# Patient Record
Sex: MALE | Race: WHITE | Employment: FULL TIME | URBAN - METROPOLITAN AREA
[De-identification: names, ages, dates, MRNs, and addresses within clinical notes are randomized per-mention and may not be internally consistent; named-entity substitution may affect disease eponyms.]

---

## 2018-03-09 DIAGNOSIS — H90.11 CONDUCTIVE HEARING LOSS OF RIGHT EAR WITH UNRESTRICTED HEARING OF LEFT EAR: ICD-10-CM

## 2018-03-09 DIAGNOSIS — Z90.89 HISTORY OF MASTOIDECTOMY: Primary | ICD-10-CM

## 2018-03-09 DIAGNOSIS — H71.91 CHOLESTEATOMA, RIGHT: ICD-10-CM

## 2018-03-15 ENCOUNTER — RADIANT APPOINTMENT (OUTPATIENT)
Dept: CT IMAGING | Facility: CLINIC | Age: 38
End: 2018-03-15
Attending: OTOLARYNGOLOGY
Payer: COMMERCIAL

## 2018-03-15 DIAGNOSIS — H71.91 CHOLESTEATOMA, RIGHT: ICD-10-CM

## 2018-03-15 DIAGNOSIS — H90.11 CONDUCTIVE HEARING LOSS OF RIGHT EAR WITH UNRESTRICTED HEARING OF LEFT EAR: ICD-10-CM

## 2018-03-15 DIAGNOSIS — Z90.89 HISTORY OF MASTOIDECTOMY: ICD-10-CM

## 2018-03-15 PROCEDURE — 70480 CT ORBIT/EAR/FOSSA W/O DYE: CPT | Mod: TC

## 2018-04-13 ENCOUNTER — TELEPHONE (OUTPATIENT)
Dept: OTOLARYNGOLOGY | Facility: CLINIC | Age: 38
End: 2018-04-13

## 2018-04-13 NOTE — TELEPHONE ENCOUNTER
Left patient voicemail regarding scheduled appointment with Dr Pena. Need to know where records are located for patient. Gave patient my direct phone number to contact.      Coral  [facility] Coordinator      4-13-18 Received message back from patient.    4-16-18 Left patient voicemail regarding scheduled appointment with Dr Pena. Gave patient my direct phone number to contact.

## 2018-04-16 DIAGNOSIS — H91.90 HEARING LOSS: Primary | ICD-10-CM

## 2018-04-16 NOTE — TELEPHONE ENCOUNTER
FUTURE VISIT INFORMATION      FUTURE VISIT INFORMATION:    Date: 4-19-18    Time:     Location:   REFERRAL INFORMATION:    Referring provider:      Referring providers clinic:      Reason for visit/diagnosis  History of mastoidectomy, cholesteatoma, right    conductive hearing loss of right ear with unrestricted hearing of left ear    RECORDS REQUESTED FROM:       Clinic name Comments Records Status Imaging Status   [place] [facility] Beatrice Dey-([place]) Patient states all records are from when he was a child and also has no way of signing and returning a signed authorization                                RECORDS STATUS

## 2018-04-19 ENCOUNTER — OFFICE VISIT (OUTPATIENT)
Dept: AUDIOLOGY | Facility: CLINIC | Age: 38
End: 2018-04-19
Payer: COMMERCIAL

## 2018-04-19 ENCOUNTER — OFFICE VISIT (OUTPATIENT)
Dept: OTOLARYNGOLOGY | Facility: CLINIC | Age: 38
End: 2018-04-19
Payer: COMMERCIAL

## 2018-04-19 ENCOUNTER — PRE VISIT (OUTPATIENT)
Dept: OTOLARYNGOLOGY | Facility: CLINIC | Age: 38
End: 2018-04-19

## 2018-04-19 VITALS — HEIGHT: 72 IN | BODY MASS INDEX: 27.09 KG/M2 | WEIGHT: 200 LBS

## 2018-04-19 DIAGNOSIS — H90.11 CONDUCTIVE HEARING LOSS OF RIGHT EAR WITH UNRESTRICTED HEARING OF LEFT EAR: Primary | ICD-10-CM

## 2018-04-19 DIAGNOSIS — Z90.89 HISTORY OF MASTOIDECTOMY: ICD-10-CM

## 2018-04-19 DIAGNOSIS — H91.90 HEARING LOSS: ICD-10-CM

## 2018-04-19 RX ORDER — AMOXICILLIN 500 MG/1
CAPSULE ORAL
Refills: 0 | COMMUNITY
Start: 2018-04-10 | End: 2018-08-03

## 2018-04-19 ASSESSMENT — PAIN SCALES - GENERAL: PAINLEVEL: NO PAIN (0)

## 2018-04-19 NOTE — LETTER
4/19/2018      RE: Osman Parks  [address] [facility] [address]   [facility] [address]       Osman Parks is seen today in follow-up. He was last seen in our clinic on 6/15/2016. He has a history of several right ear surgeries, with his most recent surgery being many years ago (~10 years at least). He has had a right canal wall down mastoidectomy. He follows with Dr. Barron, but has not seen him for about 2 years. He last saw us in 2016 and it this time there was some concern for a cholesteatoma. He presents today for follow-up with a new CT scan.    He reports that he has almost constant drainage from the right ear. He denies any changes in hearing. He does have tinnitus but this is stable. He intermittently has pain on the right. He denies any dizziness or vertigo. He has not had his mastoid bowl cleaned out for at least 2 years.     Review of systems:    Patient Supplied Answers to Review of Systems   ENT ROS 4/19/2018   Constitutional -   Ears, Nose, Throat Hearing loss, Ear pain, Ringing/noise in ears   Musculoskeletal -   A full 10 point review of systems was conducted and all other systems negative unless mentioned above or in HPI.     Physical examination:  male in no acute distress.  Alert and answering questions appropriately. HB 1/6 bilaterally. Right ear examined under the microscope. Surgical changes consistent with a right canal wall down mastoidectomy, though his meatus is small making visualization of the entire cavity difficult. There was copious amounts of debris and cerumen present within the cavity that was debrided using currette, right angle, alligator forceps, and suction. There was some debris very superior and posterior that could not be reached in clinic. The TM is healthy in appearance without perforation. The entire cavity was dry with healthy tissue throughout. No evidence of cholesteatoma.     Audiogram: Audiogram from today is reviewed. This shows normal hearing on left with PTA of  14dB, SRT of 10dB, and WRS of 100% at 50dB. On the right, he has a mild sloping to moderately severe conductive loss with PTA of 47dB, SRT of 30dB, and WRS of 100% at 80 dB. He has a type B tympanogram on right and type A on left. Only the left ipsi reflex was tested and that was normal.     CT: CT temporal bone was reviewed today. He has a small meatus (partly due to some bony overgrowth) and high facial ridge. No evidence of cholesteatoma. He appears to have had an incus interposition graft.     Assessment and plan:  Osman Pakrs is a 37 year old male with PMHx of many right-sided ear surgeries including a right canal wall down mastoidectomy about 10 years ago by an outside provider. He presents today for routine follow-up. CT scan does not show any evidence of cholesteatoma and after mastoid bowl debridement, no cholesteatoma on examination. Given how small his meatus is compared to his mastoid cavity, we discussed with him the recommendation to undergo meatoplasty to facilitate cleaning. We had a long discussion regarding the importance of regular debridements to prevent the formation of cholesteatoma. In addition, given his stable hearing, we would not recommend trying to do a new prosthesis on that side. If we can keep his ear dry, he can consider a hearing aid on that side. Patient does not want to make a decision about the meatoplasty at this time and will let us know if he wants to go forward. Otherwise, we recommend regular follow-up with Dr. Barron (at least yearly) for mastoid bowl debridements. All questions were answered and he is in agreement with this plan.     I, Carolyn Pena, saw this patient with the resident/fellow and agree with the resident s findings and plan of care as documented in the resident s/fellow s note. I was present for the entire procedure.    MD Carolyn Gee MD

## 2018-04-19 NOTE — LETTER
4/19/2018       RE: Osman Parks  [address]   [facility] [address]     Dear Colleague,    Thank you for referring your patient, Osman Parks, to the [facility] at [facility]. Please see a copy of my visit note below.    Osman Parks is seen today in follow-up. He was last seen in our clinic on 6/15/2016. He has a history of several right ear surgeries, with his most recent surgery being many years ago (~10 years at least). He has had a right canal wall down mastoidectomy. He follows with Dr. Barron, but has not seen him for about 2 years. He last saw us in 2016 and it this time there was some concern for a cholesteatoma. He presents today for follow-up with a new CT scan.    He reports that he has almost constant drainage from the right ear. He denies any changes in hearing. He does have tinnitus but this is stable. He intermittently has pain on the right. He denies any dizziness or vertigo. He has not had his mastoid bowl cleaned out for at least 2 years.     Review of systems:    Patient Supplied Answers to Review of Systems   ENT ROS 4/19/2018   Constitutional -   Ears, Nose, Throat Hearing loss, Ear pain, Ringing/noise in ears   Musculoskeletal -   A full 10 point review of systems was conducted and all other systems negative unless mentioned above or in HPI.     Physical examination:  male in no acute distress.  Alert and answering questions appropriately. HB 1/6 bilaterally. Right ear examined under the microscope. Surgical changes consistent with a right canal wall down mastoidectomy, though his meatus is small making visualization of the entire cavity difficult. There was copious amounts of debris and cerumen present within the cavity that was debrided using currette, right angle, alligator forceps, and suction. There was some debris very superior and posterior that could not be reached in clinic. The TM is healthy in appearance  without perforation. The entire cavity was dry with healthy tissue throughout. No evidence of cholesteatoma.     Audiogram: Audiogram from today is reviewed. This shows normal hearing on left with PTA of 14dB, SRT of 10dB, and WRS of 100% at 50dB. On the right, he has a mild sloping to moderately severe conductive loss with PTA of 47dB, SRT of 30dB, and WRS of 100% at 80 dB. He has a type B tympanogram on right and type A on left. Only the left ipsi reflex was tested and that was normal.     CT: CT temporal bone was reviewed today. He has a small meatus (partly due to some bony overgrowth) and high facial ridge. No evidence of cholesteatoma. He appears to have had an incus interposition graft.     Assessment and plan:  Osman Parks is a 37 year old male with PMHx of many right-sided ear surgeries including a right canal wall down mastoidectomy about 10 years ago by an outside provider. He presents today for routine follow-up. CT scan does not show any evidence of cholesteatoma and after mastoid bowl debridement, no cholesteatoma on examination. Given how small his meatus is compared to his mastoid cavity, we discussed with him the recommendation to undergo meatoplasty to facilitate cleaning. We had a long discussion regarding the importance of regular debridements to prevent the formation of cholesteatoma. In addition, given his stable hearing, we would not recommend trying to do a new prosthesis on that side. If we can keep his ear dry, he can consider a hearing aid on that side. Patient does not want to make a decision about the meatoplasty at this time and will let us know if he wants to go forward. Otherwise, we recommend regular follow-up with Dr. Barron (at least yearly) for mastoid bowl debridements. All questions were answered and he is in agreement with this plan.     I, Carolyn Pena, saw this patient with the resident/fellow and agree with the resident s findings and plan of care as documented in the  resident s/fellow s note. I was present for the entire procedure.    Carolyn Pena MD

## 2018-04-19 NOTE — MR AVS SNAPSHOT
After Visit Summary   2018    Osman Parks    MRN: [id number]           Patient Information     Date Of Birth          1980        Visit Information        Provider Department      2018 1:00 PM Cintia Tripp, Herman MILAN [facility] Audiology        Today's Diagnoses     Conductive hearing loss of right ear with unrestricted hearing of left ear    -  1    Hearing loss           Follow-ups after your visit        Who to contact     Please call your clinic at [phone] to:    Ask questions about your health    Make or cancel appointments    Discuss your medicines    Learn about your test results    Speak to your doctor            Additional Information About Your Visit        MyChart Information     [employer] is an electronic gateway that provides easy, online access to your medical records. With [employer], you can request a clinic appointment, read your test results, renew a prescription or communicate with your care team.     To sign up for [employer] visit the website at [email or web address]   You will be asked to enter the access code listed below, as well as some personal information. Please follow the directions to create your username and password.     Your access code is: [email or web address]  Expires: 2018  3:32 PM     Your access code will  in 90 days. If you need help or a new code, please contact your [facility] or call [phone] for assistance.        Care EveryWhere ID     This is your Care EveryWhere ID. This could be used by other organizations to access your [place] medical records  [id number]         Blood Pressure from Last 3 Encounters:   16 (!) 134/95   16 130/88   16 128/85    Weight from Last 3 Encounters:   18 90.7 kg (200 lb)   16 88.5 kg (195 lb)   16 88.7 kg (195 lb 8 oz)              We Performed the Following     AUDIOGRAM/TYMPANOGRAM - INTERFACE     AUDIOLOGY ADULT REFERRAL     [facility]  Audiometry Thrshld Eval & Speech Recog ([id number])     Reduced 52 - Tymps / Reflex   ([id number])     Evon   ([id number])        Primary Care Provider Office Phone # Fax #    Panchito Ireland [phone]       [address]  [facility] [address]        Equal Access to Services     [facility]: Hadii aad ku hadasho Soomaali, waaxda luqadaha, qaybta kaalmada adeegyada, waxay idiin hayaan adeeg kharash la'aan . So [facility] [phone].    ATENCIÓN: Si habla español, tiene a aguilar disposición servicios gratuitos de asistencia lingüística. Anthonyame al [phone].    We comply with applicable federal civil rights laws and [place] laws. We do not discriminate on the basis of race, color, national origin, age, disability, sex, sexual orientation, or gender identity.            Thank you!     Thank you for choosing [facility] AUDIOLOGY  for your care. Our goal is always to provide you with excellent care. Hearing back from our patients is one way we can continue to improve our services. Please take a few minutes to complete the written survey that you may receive in the mail after your visit with us. Thank you!             Your Updated Medication List - Protect others around you: Learn how to safely use, store and throw away your medicines at www.disposemymeds.org.          This list is accurate as of 4/19/18  4:12 PM.  Always use your most recent med list.                   Brand Name Dispense Instructions for use Diagnosis    amoxicillin 500 MG capsule    AMOXIL     TAKE 1 CAPSULE BY MOUTH EVERY 8 HOURS UNTIL GONE

## 2018-04-19 NOTE — PROGRESS NOTES
Osman Parks is seen today in follow-up. He was last seen in our clinic on 6/15/2016. He has a history of several right ear surgeries, with his most recent surgery being many years ago (~10 years at least). He has had a right canal wall down mastoidectomy. He follows with Dr. Barron, but has not seen him for about 2 years. He last saw us in 2016 and it this time there was some concern for a cholesteatoma. He presents today for follow-up with a new CT scan.    He reports that he has almost constant drainage from the right ear. He denies any changes in hearing. He does have tinnitus but this is stable. He intermittently has pain on the right. He denies any dizziness or vertigo. He has not had his mastoid bowl cleaned out for at least 2 years.     Review of systems:    Patient Supplied Answers to Review of Systems   ENT ROS 4/19/2018   Constitutional -   Ears, Nose, Throat Hearing loss, Ear pain, Ringing/noise in ears   Musculoskeletal -   A full 10 point review of systems was conducted and all other systems negative unless mentioned above or in HPI.     Physical examination:  male in no acute distress.  Alert and answering questions appropriately. HB 1/6 bilaterally. Right ear examined under the microscope. Surgical changes consistent with a right canal wall down mastoidectomy, though his meatus is small making visualization of the entire cavity difficult. There was copious amounts of debris and cerumen present within the cavity that was debrided using currette, right angle, alligator forceps, and suction. There was some debris very superior and posterior that could not be reached in clinic. The TM is healthy in appearance without perforation. The entire cavity was dry with healthy tissue throughout. No evidence of cholesteatoma.     Audiogram: Audiogram from today is reviewed. This shows normal hearing on left with PTA of 14dB, SRT of 10dB, and WRS of 100% at 50dB. On the right, he has a mild sloping to  moderately severe conductive loss with PTA of 47dB, SRT of 30dB, and WRS of 100% at 80 dB. He has a type B tympanogram on right and type A on left. Only the left ipsi reflex was tested and that was normal.     CT: CT temporal bone was reviewed today. He has a small meatus (partly due to some bony overgrowth) and high facial ridge. No evidence of cholesteatoma. He appears to have had an incus interposition graft.     Assessment and plan:  Osman Parks is a 37 year old male with PMHx of many right-sided ear surgeries including a right canal wall down mastoidectomy about 10 years ago by an outside provider. He presents today for routine follow-up. CT scan does not show any evidence of cholesteatoma and after mastoid bowl debridement, no cholesteatoma on examination. Given how small his meatus is compared to his mastoid cavity, we discussed with him the recommendation to undergo meatoplasty to facilitate cleaning. We had a long discussion regarding the importance of regular debridements to prevent the formation of cholesteatoma. In addition, given his stable hearing, we would not recommend trying to do a new prosthesis on that side. If we can keep his ear dry, he can consider a hearing aid on that side. Patient does not want to make a decision about the meatoplasty at this time and will let us know if he wants to go forward. Otherwise, we recommend regular follow-up with Dr. Barron (at least yearly) for mastoid bowl debridements. All questions were answered and he is in agreement with this plan.     I, Carolyn Pena, saw this patient with the resident/fellow and agree with the resident s findings and plan of care as documented in the resident s/fellow s note. I was present for the entire procedure.    Carolyn Pena MD

## 2018-04-19 NOTE — PROGRESS NOTES
AUDIOLOGY REPORT    SUMMARY: Audiology visit completed. See audiogram for results.      RECOMMENDATIONS: Follow-up with ENT.      Yomaira Arnold, CCC-A  Licensed Audiologist  [place] #[id number]

## 2018-04-19 NOTE — NURSING NOTE
Chief Complaint   Patient presents with     Consult     History of Mastoidectomy,cholesteatoma     Demetrice Lucas Medical Assistant

## 2018-04-19 NOTE — MR AVS SNAPSHOT
After Visit Summary   2018    Osman Parks    MRN: [id number]           Patient Information     Date Of Birth          1980        Visit Information        Provider Department      2018 2:00 PM Carolyn Pena MD [facility] N[facility] Throat        Care Instructions    Please call if you are interested in having surgery: meatoplasty.   Please call our clinic for any questions,concerns,or worsening symptoms.      Clinic #[phone]       Option 3  for the triage nurse.    Megan ENT Nurse [phone]          Follow-ups after your visit        Who to contact     Please call your clinic at [phone] to:    Ask questions about your health    Make or cancel appointments    Discuss your medicines    Learn about your test results    Speak to your doctor            Additional Information About Your Visit        MyChart Information     "[employer]" is an electronic gateway that provides easy, online access to your medical records. With "[employer]ah[employer] Tabatabai, Broker", you can request a clinic appointment, read your test results, renew a prescription or communicate with your care team.     To sign up for [employer]ocket [employer] visit the website at [email or web address]   You will be asked to enter the access code listed below, as well as some personal information. Please follow the directions to create your username and password.     Your access code is: [email or web address]  Expires: 2018  3:32 PM     Your access code will  in 90 days. If you need help or a new code, please contact your [facility] or call [phone] for assistance.        Care EveryWhere ID     This is your Care EveryWhere ID. This could be used by other organizations to access your [place] medical records  [id number]        Your Vitals Were     Height BMI (Body Mass Index)                1.829 m (6') 27.12 kg/m2           Blood Pressure from Last 3 Encounters:   16 (!) 134/95   16 130/88   16 128/85     Weight from Last 3 Encounters:   04/19/18 90.7 kg (200 lb)   04/26/16 88.5 kg (195 lb)   04/25/16 88.7 kg (195 lb 8 oz)              Today, you had the following     No orders found for display       Primary Care Provider Office Phone # Fax #    Panchito Ireland [phone]       [address]  [place] [facility] [address]        Equal Access to Services     [facility]: Hadii aad ku hadasho Soomaali, waaxda luqadaha, qaybta kaalmada adeegyada, waxay idiin hayaan adeeg kharash la'aan . So [facility] [phone].    ATENCIÓN: Si habla español, tiene a aguilar disposición servicios gratuitos de asistencia lingüística. [facility] [phone].    We comply with applicable federal civil rights laws and [place] laws. We do not discriminate on the basis of race, color, national origin, age, disability, sex, sexual orientation, or gender identity.            Thank you!     Thank you for choosing [facility] EAR NOSE AND THROAT  for your care. Our goal is always to provide you with excellent care. Hearing back from our patients is one way we can continue to improve our services. Please take a few minutes to complete the written survey that you may receive in the mail after your visit with us. Thank you!             Your Updated Medication List - Protect others around you: Learn how to safely use, store and throw away your medicines at www.disposemymeds.org.          This list is accurate as of 4/19/18  3:32 PM.  Always use your most recent med list.                   Brand Name Dispense Instructions for use Diagnosis    amoxicillin 500 MG capsule    AMOXIL     TAKE 1 CAPSULE BY MOUTH EVERY 8 HOURS UNTIL GONE

## 2018-04-19 NOTE — PATIENT INSTRUCTIONS
Please call if you are interested in having surgery: meatoplasty.   Please call our clinic for any questions,concerns,or worsening symptoms.      Clinic #[phone]       Option 3  for the triage nurse.    Megan ENT Nurse [phone]

## 2018-08-03 ENCOUNTER — OFFICE VISIT (OUTPATIENT)
Dept: FAMILY MEDICINE | Facility: CLINIC | Age: 38
End: 2018-08-03
Payer: OTHER MISCELLANEOUS

## 2018-08-03 VITALS
HEART RATE: 75 BPM | WEIGHT: 192 LBS | HEIGHT: 71 IN | SYSTOLIC BLOOD PRESSURE: 129 MMHG | DIASTOLIC BLOOD PRESSURE: 80 MMHG | BODY MASS INDEX: 26.88 KG/M2 | TEMPERATURE: 98.4 F | OXYGEN SATURATION: 96 %

## 2018-08-03 DIAGNOSIS — S46.811A TRAPEZIUS STRAIN, RIGHT, INITIAL ENCOUNTER: ICD-10-CM

## 2018-08-03 DIAGNOSIS — M54.9 UPPER BACK PAIN ON RIGHT SIDE: Primary | ICD-10-CM

## 2018-08-03 PROCEDURE — 99213 OFFICE O/P EST LOW 20 MIN: CPT | Performed by: NURSE PRACTITIONER

## 2018-08-03 RX ORDER — CYCLOBENZAPRINE HCL 10 MG
5-10 TABLET ORAL 3 TIMES DAILY PRN
Qty: 30 TABLET | Refills: 1 | Status: SHIPPED | OUTPATIENT
Start: 2018-08-03

## 2018-08-03 RX ORDER — DICLOFENAC SODIUM 75 MG/1
75 TABLET, DELAYED RELEASE ORAL 2 TIMES DAILY PRN
Qty: 60 TABLET | Refills: 1 | Status: SHIPPED | OUTPATIENT
Start: 2018-08-03 | End: 2018-08-03

## 2018-08-03 RX ORDER — DICLOFENAC SODIUM 75 MG/1
75 TABLET, DELAYED RELEASE ORAL 2 TIMES DAILY PRN
Qty: 60 TABLET | Refills: 1 | Status: SHIPPED | OUTPATIENT
Start: 2018-08-03

## 2018-08-03 RX ORDER — CYCLOBENZAPRINE HCL 10 MG
5-10 TABLET ORAL 3 TIMES DAILY PRN
Qty: 30 TABLET | Refills: 1 | Status: SHIPPED | OUTPATIENT
Start: 2018-08-03 | End: 2018-08-03

## 2018-08-03 ASSESSMENT — PAIN SCALES - GENERAL: PAINLEVEL: MODERATE PAIN (4)

## 2018-08-03 NOTE — PROGRESS NOTES
SUBJECTIVE:   Osman Parks is a 37 year old male who presents to clinic today for the following health issues:      Patient is here today with a right shoulder injury. 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  Additional history: none    Patient Active Problem List   Diagnosis     CARDIOVASCULAR SCREENING; LDL GOAL LESS THAN 160     Tympanic membrane disorder, right     History of mastoidectomy     Other infective chronic otitis externa of right ear     History reviewed. No pertinent surgical history.    Social History   Substance Use Topics     Smoking status: Current Some Day Smoker     Types: Cigarettes     Smokeless tobacco: Never Used     Alcohol use 0.0 oz/week     0 Standard drinks or equivalent per week     Family History   Problem Relation Age of Onset     Diabetes Maternal Grandmother      Breast Cancer Maternal Grandmother            Reviewed and updated as needed this visit by clinical staff  Tobacco  Allergies  Meds  Med Hx  Surg Hx  Fam Hx  Soc Hx      Reviewed and updated as needed this visit by Provider         ROS:  Constitutional, HEENT, cardiovascular, pulmonary, gi and gu systems are negative, except as otherwise noted.    OBJECTIVE:     /80 (BP Location: Right arm, Patient Position: Chair, Cuff Size: Adult Regular)  Pulse 75  Temp 98.4  F (36.9  C) (Oral)   "Ht 5' 11\" (1.803 m)  Wt 192 lb (87.1 kg)  SpO2 96%  BMI 26.78 kg/m2  Body mass index is 26.78 kg/(m^2).  GENERAL: healthy, alert and no distress  RESP: lungs clear to auscultation - no rales, rhonchi or wheezes  CV: regular rate and rhythm, normal S1 S2, no S3 or S4, no murmur, click or rub, no peripheral edema and peripheral pulses strong  MS: No tenderness to cervical or thoracic spinous processes. Cervical ROM intact and without pain. Tenderness to right trapezius without masses or lesions. Upper extremity strength 5/5 and symmetric. Flexion and abduction right shoulder intact and without pain. Negative empty can test.   SKIN: no suspicious lesions or rashes  NEURO: Normal strength and tone, mentation intact and speech normal    Diagnostic Test Results:  none     ASSESSMENT/PLAN:       ICD-10-CM    1. Upper back pain on right side M54.9    2. Trapezius strain, right, initial encounter S46.811A cyclobenzaprine (FLEXERIL) 10 MG tablet     diclofenac (VOLTAREN) 75 MG EC tablet     DISCONTINUED: cyclobenzaprine (FLEXERIL) 10 MG tablet     DISCONTINUED: diclofenac (VOLTAREN) 75 MG EC tablet     VSS. No cough, SOB, hypotension, tachycardia to suggest pneumonia or PE (for consideration of rib pain). No acute injury to suggest fracture. Therefore, imaging not obtained.   Suspect muscular pain. Right trapezius diffusely tender to palpation. No neuro findings on exam.   Recommend physical therapy given reported pain for 1-2 months  Light duty for 2 weeks. If he feels he is unable to return from light duty, may extend another 2 weeks and refer to ortho, especially given work related injury  In the meantime, treat with muscle relaxant and NSAID  Warned of sedating potential of Flexeril    Patient Instructions   You can take 1 tablet diclofenac twice daily  You can take tylenol in between doses but do not take with other NSAIDs (i.e. Ibuprofen, aleve)    Schedule physical therapy        AUBRIE Camacho [facility] " [facility]

## 2018-08-03 NOTE — PATIENT INSTRUCTIONS
You can take 1 tablet diclofenac twice daily  You can take tylenol in between doses but do not take with other NSAIDs (i.e. Ibuprofen, aleve)    Schedule physical therapy

## 2018-08-03 NOTE — MR AVS SNAPSHOT
"              After Visit Summary   8/3/2018    Osman Parks    MRN: [id number]           Patient Information     Date Of Birth          1980        Visit Information        Provider Department      8/3/2018 1:20 PM Cinda Lozano APRN CNP [facility]        Today's Diagnoses     Upper back pain on right side    -  1    Trapezius strain, right, initial encounter          Care Instructions    You can take 1 tablet diclofenac twice daily  You can take tylenol in between doses but do not take with other NSAIDs (i.e. Ibuprofen, aleve)    Schedule physical therapy            Follow-ups after your visit        Who to contact     If you have questions or need follow up information about today's clinic visit or your schedule please contact [facility] directly at [phone].  Normal or non-critical lab and imaging results will be communicated to you by MyChart, letter or phone within 4 business days after the clinic has received the results. If you do not hear from us within 7 days, please contact the clinic through MyChart or phone. If you have a critical or abnormal lab result, we will notify you by phone as soon as possible.  Submit refill requests through [employer] or call your pharmacy and they will forward the refill request to us. Please allow 3 business days for your refill to be completed.          Additional Information About Your Visit        MyChart Information     [employer] lets you send messages to your doctor, view your test results, renew your prescriptions, schedule appointments and more. To sign up, go to [email or web address] . Click on \"Log in\" on the left side of the screen, which will take you to the Welcome page. Then click on \"Sign up Now\" on the right side of the page.     You will be asked to enter the access code listed below, as well as some personal information. Please follow the directions to create your username and password.   " "  Your access code is: [email or web address]  Expires: 2018  1:51 PM     Your access code will  in 90 days. If you need help or a new code, please call your [facility] or [phone].        Care EveryWhere ID     This is your Care EveryWhere ID. This could be used by other organizations to access your [place] medical records  [id number]        Your Vitals Were     Pulse Temperature Height Pulse Oximetry BMI (Body Mass Index)       75 98.4  F (36.9  C) (Oral) 5' 11\" (1.803 m) 96% 26.78 kg/m2        Blood Pressure from Last 3 Encounters:   18 129/80   16 (!) 134/95   16 130/88    Weight from Last 3 Encounters:   18 192 lb (87.1 kg)   18 200 lb (90.7 kg)   16 195 lb (88.5 kg)              Today, you had the following     No orders found for display         Today's Medication Changes          These changes are accurate as of 8/3/18  1:51 PM.  If you have any questions, ask your nurse or doctor.               Start taking these medicines.        Dose/Directions    cyclobenzaprine 10 MG tablet   Commonly known as:  FLEXERIL   Used for:  Trapezius strain, right, initial encounter   Started by:  Cinda Lozano APRN CNP        Dose:  5-10 mg   Take 0.5-1 tablets (5-10 mg) by mouth 3 times daily as needed for muscle spasms   Quantity:  30 tablet   Refills:  1       diclofenac 75 MG EC tablet   Commonly known as:  VOLTAREN   Used for:  Trapezius strain, right, initial encounter   Started by:  Cinda Lozano APRN CNP        Dose:  75 mg   Take 1 tablet (75 mg) by mouth 2 times daily as needed for moderate pain   Quantity:  60 tablet   Refills:  1            Where to get your medicines      These medications were sent to [facility] - [place], [place] - [address]  [address], [facility] [address]     Phone:  [phone]     cyclobenzaprine 10 MG tablet    diclofenac 75 MG EC tablet                Primary Care " Provider Office Phone # Fax #    Panchito Ireland [phone]       [address]  [place] [facility] [address]        Equal Access to Services     FRANCIS BLANTON : Virgil mariana jesus kunalo [place]ali, waaxda luqadaha, qaybta kaalmada adekarenyada, zack calderon laBryandenzel stanton. So [facility] [phone].    ATENCIÓN: Si habla español, tiene a aguilar disposición servicios gratuitos de asistencia lingüística. Llame al [phone].    We comply with applicable federal civil rights laws and [place] laws. We do not discriminate on the basis of race, color, national origin, age, disability, sex, sexual orientation, or gender identity.            Thank you!     Thank you for choosing [facility]  for your care. Our goal is always to provide you with excellent care. Hearing back from our patients is one way we can continue to improve our services. Please take a few minutes to complete the written survey that you may receive in the mail after your visit with us. Thank you!             Your Updated Medication List - Protect others around you: Learn how to safely use, store and throw away your medicines at www.disposemymeds.org.          This list is accurate as of 8/3/18  1:51 PM.  Always use your most recent med list.                   Brand Name Dispense Instructions for use Diagnosis    cyclobenzaprine 10 MG tablet    FLEXERIL    30 tablet    Take 0.5-1 tablets (5-10 mg) by mouth 3 times daily as needed for muscle spasms    Trapezius strain, right, initial encounter       diclofenac 75 MG EC tablet    VOLTAREN    60 tablet    Take 1 tablet (75 mg) by mouth 2 times daily as needed for moderate pain    Trapezius strain, right, initial encounter

## 2018-08-03 NOTE — LETTER
[address] [address]  Phone: [phone]  Fax: [phone]    August 3, 2018        Osman Turkond  [address] [place] [facility] [address]          To whom it may concern:    RE: Osman Turkond    Patient seen in clinic today for trapezius strain thought related to work. Patient may return to work /6/18 with the following:  Light duty-unable to lift more than 10 pounds. Light duty for 2 weeks.     Please contact me for questions or concerns.      Sincerely,        AUBRIE Camacho CNP

## 2018-08-08 ENCOUNTER — THERAPY VISIT (OUTPATIENT)
Dept: PHYSICAL THERAPY | Facility: CLINIC | Age: 38
End: 2018-08-08
Payer: OTHER MISCELLANEOUS

## 2018-08-08 DIAGNOSIS — M54.2 NECK PAIN ON RIGHT SIDE: Primary | ICD-10-CM

## 2018-08-08 PROCEDURE — 97110 THERAPEUTIC EXERCISES: CPT | Mod: GP | Performed by: PHYSICAL THERAPIST

## 2018-08-08 PROCEDURE — 97530 THERAPEUTIC ACTIVITIES: CPT | Mod: GP | Performed by: PHYSICAL THERAPIST

## 2018-08-08 PROCEDURE — 97161 PT EVAL LOW COMPLEX 20 MIN: CPT | Mod: GP | Performed by: PHYSICAL THERAPIST

## 2018-08-08 NOTE — PROGRESS NOTES
[facility] Initial Evaluation -- Upper Extremity    Evaluation Date: August 8, 2018  Osman Parks is a 37 year old male with a R shd condition.   Referral: GP  Work mechanical stresses:  working 8 hours day 5 days per week  working short staffed  Employment status:  FT, missed 3 days  Leisure mechanical stresses: na  Functional disability score (SPADI): see chart  VAS score (0-10): 5  Handedness (R/L):  RIGHT  Patient goals/expectations:  Work w/o pain    HISTORY    Present symptoms: R inf scapula post shd lat R CS pain.    CC inf scapular pain.  Pain quality (sharp/shooting/stabbing/aching/burning/cramping):  Sharp spasm    Present since (onset date):  8.2.2018 MD alfredo. He had been having neck pain on R for  1-2   months and then the sharp pain in shoulder blade started 8.2.2018 and he had to leave work.  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  Condition occurred in the following environment: work    Symptoms at onset:  R CS  Paresthesia (yes/no):  no  Spinal history: L scap pain 2002- lifting injury in community  Cough/Sneeze (pos/neg): pos    Constant symptoms: Y for all Intermittent symptoms:    Symptoms are worse with the following: Always Bending, Always Reaching BB, Always When still, Sometimes Sleeping (prone/sup/side R/L) - R SL, Time of day - Always AM and Sometimes PM    Symptoms are better with the following: Other - moving anti inflamm flexoril    Continued use makes the pain (better/worse/no effect): worse    Disturbed night (yes/no):  Positional R SL   Pain at rest (yes/no):   Site (neck/shoulder/elbow/wrist/hand):     Other questions (swelling/catching/clicking/locking/subluxing):  Crepitus R shd    Previous episodes: 2002 L scap  Previous treatments: PT chiro temp relief strengthening helped    Specific Questions:  General health (excellent/good/fair/poor):   good  Pertinent medical history includes: None  Medications (nil/NSAIDS/analg/steroids/anticoag/other):  NSAIDS and Muscle relaxants  Medical allergies:  Robitussin  Imaging (None/Xray/MRI/Other): none  Recent or major surgery (yes/no): no  Night pain (yes/no): no  Accidents (yes/no): no  Unexplained weight loss (yes/no): no  Barriers at home: none  Other red flags: none    Sites for physical examination (neck/shoulder/elbow/wrist/hand): CS    EXAMINATION    Posture:  Sitting (good/fair/poor): fair  Correction of posture (better/worse/no effect/NA): better  Standing (good/fair/poor): fair  Other observations:      Neurological (NA/motor/sensory/reflexes/dural): na    Baselines (pain or functional activity):     Extremities (Shoulder/Elbow/Wrist/Hand):     Movement Loss Rommel Mod Min Nil Pain   Flexion    + ERP lats   Extension        Abduction    +    Internal Rotation    + ERP ant shd   External Rotation   +  ERP inf scap   Supination        Pronation        Radial Deviation        Ulnar Deviation           Passive Movement (+/- overpressure)/(PDM/ERP):  na  Resisted Test Response (pain): na  Other Tests:     Spine:  Movement Loss: CSROM flexion 2 fingers pulling center U/CS prot no loss ret mod loss R U/CS  Ext  tension B CS U/CS  Rot min loss to R with stiffness R CS  UE AROM:  FF with ERS abd wnl er with ERP inf scap no loss IR no loss pain lats   Effect of repeated movements: RETISit 2 x 10 reps increases inc ROM, better sitting standing rot R ext shd ER FF with inc ROM rot R shd ER   Effect of static positioning:   Spine testing (not relevant/relevant/secondary problem): relevant    Baseline Symptoms:   Repeated Tests Symptom Response Mechanical Response   Active/Passive movement, resisted test, functional test During - Produce, Abolish, Increase, Decrease, NE After -   Better, Worse, NB, NW, NE Effect -   ? or ? ROM, strength or key functional test No Effect                                       Effect of  static positioning                    Provisional Classification (Extremity/Spine): Spine - Derangement - Asymmetrical, unilateral, symptoms above elbow      Principle of Management:  Education:  CS as compared to shd etiology derangement DP proper sit posture  Equipment provided:  Towel roll  Exercise and dosage:  Seated CS ret 15 x q 2-3 hrs     ASSESSMENT/PLAN:    Patient is a 37 year old male with cervical complaints.    Patient has the following significant findings with corresponding treatment plan.                Diagnosis 1:  Neck pain  Pain -  manual therapy, self management, education, directional preference exercise and home program  Decreased ROM/flexibility - manual therapy, therapeutic exercise, therapeutic activity and home program  Decreased joint mobility - manual therapy, therapeutic exercise, therapeutic activity and home program  Decreased function - therapeutic activities and home program  Impaired posture - neuro re-education, therapeutic activities and home program    Therapy Evaluation Codes:   1) History comprised of:   Personal factors that impact the plan of care:      Profession.    Comorbidity factors that impact the plan of care are:      None.     Medications impacting care: Anti-inflammatory and Muscle relaxant.  2) Examination of Body Systems comprised of:   Body structures and functions that impact the plan of care:      Cervical spine.   Activity limitations that impact the plan of care are:      Bending, Lifting, Working and Sleeping.  3) Clinical presentation characteristics are:   Stable/Uncomplicated.  4) Decision-Making    Low complexity using standardized patient assessment instrument and/or measureable assessment of functional outcome.  Cumulative Therapy Evaluation is: Low complexity.    Previous and current functional limitations:  (See Goal Flow Sheet for this information)    Short term and Long term goals: (See Goal Flow Sheet for this information)     Communication  ability:  Patient appears to be able to clearly communicate and understand verbal and written communication and follow directions correctly.  Treatment Explanation - The following has been discussed with the patient:   RX ordered/plan of care  Anticipated outcomes  Possible risks and side effects  This patient would benefit from PT intervention to resume normal activities.   Rehab potential is good.    Frequency:  2 X week, once daily  Duration:  for 4 weeks  Discharge Plan:  Achieve all LTG.  Independent in home treatment program.  Reach maximal therapeutic benefit.    Please refer to the daily flowsheet for treatment today, total treatment time and time spent performing 1:1 timed codes.   [facility] Initial Evaluation  Subjective:  HPI                    Objective:  System    Physical Exam    General     ROS

## 2018-08-09 PROBLEM — M54.2 NECK PAIN ON RIGHT SIDE: Status: ACTIVE | Noted: 2018-08-09

## 2018-08-13 ENCOUNTER — THERAPY VISIT (OUTPATIENT)
Dept: PHYSICAL THERAPY | Facility: CLINIC | Age: 38
End: 2018-08-13
Payer: COMMERCIAL

## 2018-08-13 DIAGNOSIS — M54.2 NECK PAIN ON RIGHT SIDE: ICD-10-CM

## 2018-08-13 PROCEDURE — 97530 THERAPEUTIC ACTIVITIES: CPT | Mod: GP | Performed by: PHYSICAL THERAPY ASSISTANT

## 2018-08-13 PROCEDURE — 97110 THERAPEUTIC EXERCISES: CPT | Mod: GP | Performed by: PHYSICAL THERAPY ASSISTANT

## 2018-08-17 ENCOUNTER — OFFICE VISIT (OUTPATIENT)
Dept: FAMILY MEDICINE | Facility: CLINIC | Age: 38
End: 2018-08-17
Payer: COMMERCIAL

## 2018-08-17 VITALS
SYSTOLIC BLOOD PRESSURE: 114 MMHG | WEIGHT: 192 LBS | DIASTOLIC BLOOD PRESSURE: 72 MMHG | OXYGEN SATURATION: 96 % | BODY MASS INDEX: 26.78 KG/M2 | HEART RATE: 89 BPM | TEMPERATURE: 97.5 F
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
## 2018-08-17 DIAGNOSIS — S46.811A TRAPEZIUS STRAIN, RIGHT, INITIAL ENCOUNTER: Primary | ICD-10-CM

## 2018-08-17 PROCEDURE — 99213 OFFICE O/P EST LOW 20 MIN: CPT | Performed by: NURSE PRACTITIONER

## 2018-08-17 ASSESSMENT — PAIN SCALES - GENERAL: PAINLEVEL: MILD PAIN (3)

## 2018-08-17 NOTE — LETTER
[address] [address]  Phone: [phone]  Fax: [phone]    August 17, 2018        Osman Parks  [address]   [facility] [address]          To whom it may concern:    RE: Osman Parks    Patient was seen and treated today at our clinic stemming from a work related injury on August 2nd. I recommend continuing activity restrictions for another 2 weeks. Nothing to lift greater than 10 pounds and avoid repetitive cutting motion.     Please contact me for questions or concerns.      Sincerely,        AUBRIE Camacho CNP

## 2018-08-17 NOTE — PROGRESS NOTES
SUBJECTIVE:   Osman Parks is a 37 year old male who presents to clinic today for the following health issues:      Patient is here today to follow up on his W/C injury to his right shoulder, PT is helping but still have issues with pain in his right shoulder. He has 1 PT session left    I saw him 8/3/18 for pain consistent with right trapezius strain  Treated with Flexeril and Voltaren  He did 2 sessions of physical therapy   Scheduled again next week  Activity restrictions at work  Would like them extended  Pain is improving  Taking 5 mg Flexeri in am and 10 mg at bedtime  Diclofenac BID      Problem list and histories reviewed & adjusted, as indicated.  Additional history: none    Patient Active Problem List   Diagnosis     CARDIOVASCULAR SCREENING; LDL GOAL LESS THAN 160     Tympanic membrane disorder, right     History of mastoidectomy     Other infective chronic otitis externa of right ear     Neck pain on right side     History reviewed. No pertinent surgical history.    Social History   Substance Use Topics     Smoking status: Current Some Day Smoker     Types: Cigarettes     Smokeless tobacco: Never Used     Alcohol use 0.0 oz/week     0 Standard drinks or equivalent per week     Family History   Problem Relation Age of Onset     Diabetes Maternal Grandmother      Breast Cancer Maternal Grandmother            Reviewed and updated as needed this visit by clinical staff  Tobacco  Allergies  Meds  Med Hx  Surg Hx  Fam Hx  Soc Hx      Reviewed and updated as needed this visit by Provider         ROS:  Constitutional, HEENT, cardiovascular, pulmonary, gi and gu systems are negative, except as otherwise noted.    OBJECTIVE:     /72 (BP Location: Right arm, Patient Position: Chair, Cuff Size: Adult Regular)  Pulse 89  Temp 97.5  F (36.4  C) (Oral)  Wt 192 lb (87.1 kg)  SpO2 96%  BMI 26.78 kg/m2  Body mass index is 26.78 kg/(m^2).  GENERAL: healthy, alert and no distress  MS: No tenderness  to cervical or thoracic spinous processes. Tenderness paraspinal region, right trapezius tight. No tenderness right shoulder. Flexion and abduction to 180 degrees without difficulty. Negative empty can test  SKIN: no suspicious lesions or rashes  NEURO: Normal strength and tone, mentation intact and speech normal    Diagnostic Test Results:  none     ASSESSMENT/PLAN:       ICD-10-CM    1. Trapezius strain, right, initial encounter S46.811A SPORTS MEDICINE REFERRAL     SPORTS MEDICINE REFERRAL   2. Work related injury Y99.0 SPORTS MEDICINE REFERRAL     SPORTS MEDICINE REFERRAL       Work restrictions for an additional 2 weeks  Consult at our sports medicine clinic  Continue with physical therapy and medications    AUBRIE Camacho [facility]

## 2018-08-17 NOTE — MR AVS SNAPSHOT
After Visit Summary   8/17/2018    Osman Parks    MRN: [id number]           Patient Information     Date Of Birth          1980        Visit Information        Provider Department      8/17/2018 3:00 PM Cinda Lozano APRN [facility]        Today's Diagnoses     Trapezius strain, right, initial encounter    -  1    Work related injury           Follow-ups after your visit        Additional Services     SPORTS MEDICINE REFERRAL       Your provider has referred you to:  [facility]: [facility] - [facility]  [phone]   [email or web address]    Please be aware that coverage of these services is subject to the terms and limitations of your health insurance plan.  Call member services at your health plan with any benefit or coverage questions.      Please bring the following to your appointment:    >>   Any x-rays, CTs or MRIs which have been performed.  Contact the facility where they were done to arrange for  prior to your scheduled appointment.    >>   List of current medications   >>   This referral request   >>   Any documents/labs given to you for this referral            SPORTS MEDICINE REFERRAL       Your provider has referred you to:  [facility]: [facility]  [facility] [phone] [email or web address]    Please be aware that coverage of these services is subject to the terms and limitations of your health insurance plan.  Call member services at your health plan with any benefit or coverage questions.      Please bring the following to your appointment:    >>   Any x-rays, CTs or MRIs which have been performed.  Contact the facility where they were done to arrange for  prior to your scheduled  "appointment.    >>   List of current medications   >>   This referral request   >>   Any documents/labs given to you for this referral                  Your next 10 appointments already scheduled     Aug 21, 2018  4:50 PM CDT   CHUNG Extremity with [employer] PT   [facility] [place] [facility] ([facility])    [address]  [place] [id number] [phone]              Who to contact     If you have questions or need follow up information about today's clinic visit or your schedule please contact [facility] directly at [phone].  Normal or non-critical lab and imaging results will be communicated to you by MyChart, letter or phone within 4 business days after the clinic has received the results. If you do not hear from us within 7 days, please contact the clinic through MyChart or phone. If you have a critical or abnormal lab result, we will notify you by phone as soon as possible.  Submit refill requests through [employer] or call your pharmacy and they will forward the refill request to us. Please allow 3 business days for your refill to be completed.          Additional Information About Your Visit        [employer] Information     [employer] lets you send messages to your doctor, view your test results, renew your prescriptions, schedule appointments and more. To sign up, go to [email or web address] . Click on \"Log in\" on the left side of the screen, which will take you to the Welcome page. Then click on \"Sign up Now\" on the right side of the page.     You will be asked to enter the access code listed below, as well as some personal information. Please follow the directions to create your username and password.     Your access code is: [email or web address]  Expires: 2018  1:51 PM     Your access code will  in 90 days. If you need help or a new code, please call your [facility] or [phone].        Care EveryWhere ID     This is " your Care EveryWhere ID. This could be used by other organizations to access your [place] medical records  [id number]        Your Vitals Were     Pulse Temperature Pulse Oximetry BMI (Body Mass Index)          89 97.5  F (36.4  C) (Oral) 96% 26.78 kg/m2         Blood Pressure from Last 3 Encounters:   08/17/18 114/72   08/03/18 129/80   04/26/16 (!) 134/95    Weight from Last 3 Encounters:   08/17/18 192 lb (87.1 kg)   08/03/18 192 lb (87.1 kg)   04/19/18 200 lb (90.7 kg)              We Performed the Following     SPORTS MEDICINE REFERRAL     SPORTS MEDICINE REFERRAL        Primary Care Provider Office Phone # Fax #    Panchito Ireland [phone] [address]  [facility] [address]        Equal Access to Services     [facility]: Hadii aad ku hadasho Soruchi, waaxda luqadaha, qaybta kaalmada adeegyada, zack lovell . So [facility] [phone].    ATENCIÓN: Si habla español, tiene a aguilar disposición servicios gratuitos de asistencia lingüística. Lisa al [phone].    We comply with applicable federal civil rights laws and [place] laws. We do not discriminate on the basis of race, color, national origin, age, disability, sex, sexual orientation, or gender identity.            Thank you!     Thank you for choosing [facility]  for your care. Our goal is always to provide you with excellent care. Hearing back from our patients is one way we can continue to improve our services. Please take a few minutes to complete the written survey that you may receive in the mail after your visit with us. Thank you!             Your Updated Medication List - Protect others around you: Learn how to safely use, store and throw away your medicines at www.disposemymeds.org.          This list is accurate as of 8/17/18  3:29 PM.  Always use your most recent med list.                   Brand Name Dispense Instructions for use Diagnosis    cyclobenzaprine 10 MG  tablet    FLEXERIL    30 tablet    Take 0.5-1 tablets (5-10 mg) by mouth 3 times daily as needed for muscle spasms    Trapezius strain, right, initial encounter       diclofenac 75 MG EC tablet    VOLTAREN    60 tablet    Take 1 tablet (75 mg) by mouth 2 times daily as needed for moderate pain    Trapezius strain, right, initial encounter

## 2018-08-21 ENCOUNTER — THERAPY VISIT (OUTPATIENT)
Dept: PHYSICAL THERAPY | Facility: CLINIC | Age: 38
End: 2018-08-21
Payer: COMMERCIAL

## 2018-08-21 DIAGNOSIS — M54.2 NECK PAIN ON RIGHT SIDE: ICD-10-CM

## 2018-08-21 PROCEDURE — 97110 THERAPEUTIC EXERCISES: CPT | Mod: GP | Performed by: PHYSICAL THERAPIST

## 2018-08-21 PROCEDURE — 97530 THERAPEUTIC ACTIVITIES: CPT | Mod: GP | Performed by: PHYSICAL THERAPIST

## 2018-08-28 ENCOUNTER — OFFICE VISIT (OUTPATIENT)
Dept: FAMILY MEDICINE | Facility: CLINIC | Age: 38
End: 2018-08-28
Payer: OTHER MISCELLANEOUS

## 2018-08-28 VITALS
WEIGHT: 193 LBS | TEMPERATURE: 99.1 F | HEART RATE: 75 BPM | BODY MASS INDEX: 26.92 KG/M2 | DIASTOLIC BLOOD PRESSURE: 75 MMHG | SYSTOLIC BLOOD PRESSURE: 124 MMHG | OXYGEN SATURATION: 96 %

## 2018-08-28 DIAGNOSIS — M54.2 NECK PAIN ON RIGHT SIDE: ICD-10-CM

## 2018-08-28 PROCEDURE — 99213 OFFICE O/P EST LOW 20 MIN: CPT | Performed by: NURSE PRACTITIONER

## 2018-08-28 ASSESSMENT — PAIN SCALES - GENERAL: PAINLEVEL: NO PAIN (0)

## 2018-08-28 NOTE — PATIENT INSTRUCTIONS
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

## 2018-08-28 NOTE — PROGRESS NOTES
SUBJECTIVE:   Osman Parks is a 37 year old male who presents to clinic today for the following health issues:      Patient is here today to follow up on w/c injury  He reports the pain is improving  Continuing to see PHYSICAL THERAPY  Working on neck exercises and stretches  He has been working with 10 pound restrictions and avoid cutting motion  He is taking Voltaren twice daily  1/2 Flexeril in am and 1 tablet at pm  Right lateral rib pain when sneezing  Neck pain  Upper shoulder stiffness  He is wondering if he can gradually return to work  Did not schedule with sports medicine clinic            Problem list and histories reviewed & adjusted, as indicated.  Additional history: none    Patient Active Problem List   Diagnosis     CARDIOVASCULAR SCREENING; LDL GOAL LESS THAN 160     Tympanic membrane disorder, right     History of mastoidectomy     Other infective chronic otitis externa of right ear     Neck pain on right side     History reviewed. No pertinent surgical history.    Social History   Substance Use Topics     Smoking status: Current Some Day Smoker     Types: Cigarettes     Smokeless tobacco: Never Used     Alcohol use 0.0 oz/week     0 Standard drinks or equivalent per week     Family History   Problem Relation Age of Onset     Diabetes Maternal Grandmother      Breast Cancer Maternal Grandmother            Reviewed and updated as needed this visit by clinical staff  Tobacco  Allergies  Meds  Med Hx  Surg Hx  Fam Hx  Soc Hx      Reviewed and updated as needed this visit by Provider         ROS:  Constitutional, HEENT, cardiovascular, pulmonary, gi and gu systems are negative, except as otherwise noted.    OBJECTIVE:     /75 (BP Location: Right arm, Patient Position: Chair, Cuff Size: Adult Regular)  Pulse 75  Temp 99.1  F (37.3  C) (Oral)  Wt 193 lb (87.5 kg)  SpO2 96%  BMI 26.92 kg/m2  Body mass index is 26.92 kg/(m^2).  GENERAL: healthy, alert and no distress  RESP: lungs  clear to auscultation - no rales, rhonchi or wheezes  CV: regular rate and rhythm, normal S1 S2, no S3 or S4, no murmur, click or rub, no peripheral edema and peripheral pulses strong  MS: Tenderness right cervical paraspinal region without limitations ROM. Upper extremity strength 5/5 and symmetric.   SKIN: no suspicious lesions or rashes  NEURO: Normal strength and tone, mentation intact and speech normal    Diagnostic Test Results:  none     ASSESSMENT/PLAN:       ICD-10-CM    1. 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

## 2018-08-28 NOTE — LETTER
[address] [address]  Phone: [phone]  Fax: [phone]    August 28, 2018        Osman Parks  [address] [facility] [address]          To whom it may concern:    RE: Osman Parks    Patient was seen and treated today at our clinic. 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 Recommend 15 pound lifting restriction.     Please contact me for questions or concerns.      Sincerely,        AUBRIE Camacho CNP

## 2018-08-28 NOTE — MR AVS SNAPSHOT
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  Please call the sports medicine clinic to schedule consult for ongoing work restrictions as I don't want you to go back too soon and worsen your injury  You can call [phone] to schedule           Follow-ups after your visit        Additional Services     SPORTS MEDICINE REFERRAL       Your provider has referred you to:  [facility]: [place] [facility] - [facility]  [phone]   [email or web address]    Please be aware that coverage of these services is subject to the terms and limitations of your health insurance plan.  Call member services at your health plan with any benefit or coverage questions.      Please bring the following to your appointment:    >>   Any x-rays, CTs or MRIs which have been performed.  Contact the facility where they were done to arrange for  prior to your scheduled appointment.    >>   List of current medications   >>   This referral request   >>   Any documents/labs given to you for this referral                  Your next 10 appointments already scheduled     Aug 30, 2018  4:10 PM CDT   CHUNG Extremity with [employer] PT   [facility] [place] [facility] ([facility] [place])    [address]  [place] [address]   [phone]            Sep 05, 2018  3:40 PM CDT   CHUNG Extremity with [employer] PT   [facility] [place] [facility] ([facility] [place])     "[address] [facility] [address]  [facility] [address] [phone]              Who to contact     If you have questions or need follow up information about today's clinic visit or your schedule please contact [facility] directly at [phone].  Normal or non-critical lab and imaging results will be communicated to you by MyChart, letter or phone within 4 business days after the clinic has received the results. If you do not hear from us within 7 days, please contact the clinic through MyChart or phone. If you have a critical or abnormal lab result, we will notify you by phone as soon as possible.  Submit refill requests through [employer] or call your pharmacy and they will forward the refill request to us. Please allow 3 business days for your refill to be completed.          Additional Information About Your Visit        MyChart Information     [employer] lets you send messages to your doctor, view your test results, renew your prescriptions, schedule appointments and more. To sign up, go to [email or web address] . Click on \"Log in\" on the left side of the screen, which will take you to the Welcome page. Then click on \"Sign up Now\" on the right side of the page.     You will be asked to enter the access code listed below, as well as some personal information. Please follow the directions to create your username and password.     Your access code is: [email or web address]  Expires: 2018  1:51 PM     Your access code will  in 90 days. If you need help or a new code, please call your [facility] or [phone].        Care EveryWhere ID     This is your Care EveryWhere ID. This could be used by other organizations to access your [place] medical records  [id number]        Your Vitals Were     Pulse Temperature Pulse Oximetry BMI (Body Mass Index)          75 99.1  F (37.3  C) (Oral) 96% 26.92 kg/m2         Blood Pressure from Last 3 Encounters:   18 124/75   18 114/72 "   08/03/18 129/80    Weight from Last 3 Encounters:   08/28/18 193 lb (87.5 kg)   08/17/18 192 lb (87.1 kg)   08/03/18 192 lb (87.1 kg)              We Performed the Following     SPORTS MEDICINE REFERRAL        Primary Care Provider Office Phone # Fax #    Panchito Ireland [phone]       [address]  [place] [facility] [address]        Equal Access to Services     [facility]: Hadii aad ku hadasho Soomaali, waaxda luqadaha, qaybta kaalmada adeegyada, waxay idiin hayaan adeeg kharash la'sajin . So [facility] [phone].    ATENCIÓN: Si habla español, tiene a aguilar disposición servicios gratuitos de asistencia lingüística. [facility] [phone].    We comply with applicable federal civil rights laws and [place] laws. We do not discriminate on the basis of race, color, national origin, age, disability, sex, sexual orientation, or gender identity.            Thank you!     Thank you for choosing [facility]  for your care. Our goal is always to provide you with excellent care. Hearing back from our patients is one way we can continue to improve our services. Please take a few minutes to complete the written survey that you may receive in the mail after your visit with us. Thank you!             Your Updated Medication List - Protect others around you: Learn how to safely use, store and throw away your medicines at www.disposemymeds.org.          This list is accurate as of 8/28/18  4:55 PM.  Always use your most recent med list.                   Brand Name Dispense Instructions for use Diagnosis    cyclobenzaprine 10 MG tablet    FLEXERIL    30 tablet    Take 0.5-1 tablets (5-10 mg) by mouth 3 times daily as needed for muscle spasms    Trapezius strain, right, initial encounter       diclofenac 75 MG EC tablet    VOLTAREN    60 tablet    Take 1 tablet (75 mg) by mouth 2 times daily as needed for moderate pain    Trapezius strain, right, initial encounter

## 2018-09-05 ENCOUNTER — THERAPY VISIT (OUTPATIENT)
Dept: PHYSICAL THERAPY | Facility: CLINIC | Age: 38
End: 2018-09-05
Payer: OTHER MISCELLANEOUS

## 2018-09-05 ENCOUNTER — OFFICE VISIT (OUTPATIENT)
Dept: ORTHOPEDICS | Facility: CLINIC | Age: 38
End: 2018-09-05
Payer: OTHER MISCELLANEOUS

## 2018-09-05 VITALS
HEART RATE: 85 BPM | SYSTOLIC BLOOD PRESSURE: 122 MMHG | OXYGEN SATURATION: 96 % | WEIGHT: 193 LBS | BODY MASS INDEX: 27.02 KG/M2 | DIASTOLIC BLOOD PRESSURE: 88 MMHG | HEIGHT: 71 IN

## 2018-09-05 DIAGNOSIS — M54.2 NECK PAIN ON RIGHT SIDE: ICD-10-CM

## 2018-09-05 DIAGNOSIS — S46.911A STRAIN OF RIGHT SHOULDER, INITIAL ENCOUNTER: Primary | ICD-10-CM

## 2018-09-05 PROCEDURE — 97110 THERAPEUTIC EXERCISES: CPT | Mod: GP | Performed by: PHYSICAL THERAPIST

## 2018-09-05 PROCEDURE — 99213 OFFICE O/P EST LOW 20 MIN: CPT | Performed by: FAMILY MEDICINE

## 2018-09-05 ASSESSMENT — PAIN SCALES - GENERAL: PAINLEVEL: NO PAIN (1)

## 2018-09-05 NOTE — LETTER
September 5, 2018      Osman Parks  [address]   [place] [facility] [address]              Dear  or Ab Chandler Charly is under my professional care for an overuse injury of his shoulder and back area that he sustained at work.  Part of his recovery plan includes slowly increasing his work load as tolerated.  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  These restrictions will be in effect until September 24, 2018.    Please call with questions or concerns.      Sincerely,              Floyd Costello, DO CAQSM

## 2018-09-05 NOTE — LETTER
9/5/2018         RE: Osman Parks  [address]   [place] [address]        Dear Colleague,    Thank you for referring your patient, Osman Parks, to the [facility]. Please see a copy of my visit note below.    CHIEF COMPLAINT:  Consult (DOI 08/02/18 pt states right neck and shoulder pain.)       HISTORY OF PRESENT ILLNESS  Mr. Parks is a pleasant 37 year old year old male who presents to clinic today with right sided shoulder and neck pain.  Ab is seen at the request of Cinda Lozano.    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  While there was no acute event, he noticed after a long day that his right posterior shoulder was hurting him quite a bit.  He was seen by his primary care physician and diagnosed with a muscular strain that he has been attending physical therapy for.  He feels about 70-80% better overall, although he does still have some pain.  He is here mostly to discuss return to work recommendations.  He has been prescribed Flexeril and diclofenac, both of these are helping.    Additional history: as documented    MEDICAL HISTORY  Patient Active Problem List   Diagnosis     CARDIOVASCULAR SCREENING; LDL GOAL LESS THAN 160     Tympanic membrane disorder, right     History of mastoidectomy     Other infective chronic otitis externa of right ear     Neck pain on right side       Current Outpatient Prescriptions   Medication Sig Dispense Refill     cyclobenzaprine (FLEXERIL) 10 MG tablet Take 0.5-1 tablets (5-10 mg) by mouth 3 times daily as needed for muscle spasms 30 tablet 1     diclofenac (VOLTAREN) 75 MG EC tablet Take 1 tablet (75 mg) by mouth 2 times daily as needed for moderate pain 60 tablet 1       Allergies   Allergen Reactions     Robitussin Cough-Cold D        Family History   Problem Relation Age of Onset     Diabetes Maternal Grandmother  "     Breast Cancer Maternal Grandmother        Additional medical/Social/Surgical histories reviewed in [facility] and updated as appropriate.     REVIEW OF SYSTEMS (9/5/2018)  CONSTITUTIONAL: Denies fever and weight loss  EYES: Denies acute vision changes  ENT: Denies hearing changes or difficulty swallowing  CARDIAC: Denies chest pain or edema  RESPIRATORY: Denies dyspnea, cough or wheeze  GASTROINTESTINAL: Denies abdominal pain, nausea, vomiting  MUSCULOSKELETAL: See HPI  SKIN: Denies any recent rash or lesion  NEUROLOGICAL: Denies numbness or focal weakness  PSYCHIATRIC: No history of psychiatric symptoms or problems  ENDOCRINE: Denies current diagnosis of diabetes  HEMATOLOGY: Denies episodes of easy bleeding      PHYSICAL EXAM  Vitals:    09/05/18 1426   BP: 122/88   Pulse: 85   SpO2: 96%   Weight: 87.5 kg (193 lb)   Height: 1.803 m (5' 11\")     General  - normal appearance, in no obvious distress  CV  - normal peripheral perfusion  Pulm  - normal respiratory pattern, non-labored  Musculoskeletal - cervical spine  - inspection: normal bone and joint alignment, no obvious kyphosis  - palpation: mild tenderness over right rhomboids  - ROM: no pain with rotation or sidebending  - strength: upper extremities 5/5 in all planes  Neuro  - C5-7 DTRs 2+ bilaterally, no sensory or motor deficit, grossly normal coordination, normal muscle tone  Skin  - no ecchymosis, erythema, warmth, or induration, no obvious rash  Psych  - interactive, appropriate, normal mood and affect           ASSESSMENT & PLAN  Mr. Parks is a 37 year old year old male who presents to clinic today with a work-related right posterior shoulder/back strain.    We discussed his condition in detail.  We also discussed a slow return-to-full-work strategy that I do believe is in his best interest.  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  These " restrictions will be in place for the next 2-1/2 weeks, approximately.    We should follow-up at that time to reassess.    Thank you for allowing me to participate in Ab's care.    Floyd Costello DO, [facility]  Primary Care Sports Medicine           Again, thank you for allowing me to participate in the care of your patient.        Sincerely,        Floyd Costello DO

## 2018-09-05 NOTE — NURSING NOTE
"Osman Parks's chief complaint for this visit includes:  Chief Complaint   Patient presents with     Consult     DOI 08/02/18 pt states right neck and shoulder pain.     PCP: Panchito Ireland    Referring Provider:  AUBRIE Hicks CNP  [address] [place] [address]    /88  Pulse 85  Ht 1.803 m (5' 11\")  Wt 87.5 kg (193 lb)  SpO2 96%  BMI 26.92 kg/m2  No Pain (1)     Do you need any medication refills at today's visit? No        "

## 2018-09-05 NOTE — PATIENT INSTRUCTIONS
Thanks for coming today.  Ortho/Sports Medicine Clinic  [address] [place] [address]    To schedule future appointments in Ortho Clinic, you may call [phone].    To schedule ordered imaging by your provider:   Call Central Imaging [phone]    To schedule an injection ordered by your provider:  Call Central Imaging Injection scheduling line: [phone]  [employer]hart available online at:  [email or web address]    Please call if any further questions or concerns ([phone]).  Clinic hours 8 am to 5 pm.    Return to clinic (call) if symptoms worsen or fail to improve.

## 2018-09-05 NOTE — PROGRESS NOTES
CHIEF COMPLAINT:  Consult (DOI 08/02/18 pt states right neck and shoulder pain.)       HISTORY OF PRESENT ILLNESS  Mr. Parks is a pleasant 37 year old year old male who presents to clinic today with right sided shoulder and neck pain.  Ab is seen at the request of Cinda Lozano.    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  While there was no acute event, he noticed after a long day that his right posterior shoulder was hurting him quite a bit.  He was seen by his primary care physician and diagnosed with a muscular strain that he has been attending physical therapy for.  He feels about 70-80% better overall, although he does still have some pain.  He is here mostly to discuss return to work recommendations.  He has been prescribed Flexeril and diclofenac, both of these are helping.    Additional history: as documented    MEDICAL HISTORY  Patient Active Problem List   Diagnosis     CARDIOVASCULAR SCREENING; LDL GOAL LESS THAN 160     Tympanic membrane disorder, right     History of mastoidectomy     Other infective chronic otitis externa of right ear     Neck pain on right side       Current Outpatient Prescriptions   Medication Sig Dispense Refill     cyclobenzaprine (FLEXERIL) 10 MG tablet Take 0.5-1 tablets (5-10 mg) by mouth 3 times daily as needed for muscle spasms 30 tablet 1     diclofenac (VOLTAREN) 75 MG EC tablet Take 1 tablet (75 mg) by mouth 2 times daily as needed for moderate pain 60 tablet 1       Allergies   Allergen Reactions     Robitussin Cough-Cold D        Family History   Problem Relation Age of Onset     Diabetes Maternal Grandmother      Breast Cancer Maternal Grandmother        Additional medical/Social/Surgical histories reviewed in [facility] and updated as appropriate.     REVIEW OF SYSTEMS (9/5/2018)  CONSTITUTIONAL: Denies fever and weight loss  EYES: Denies acute vision  "changes  ENT: Denies hearing changes or difficulty swallowing  CARDIAC: Denies chest pain or edema  RESPIRATORY: Denies dyspnea, cough or wheeze  GASTROINTESTINAL: Denies abdominal pain, nausea, vomiting  MUSCULOSKELETAL: See HPI  SKIN: Denies any recent rash or lesion  NEUROLOGICAL: Denies numbness or focal weakness  PSYCHIATRIC: No history of psychiatric symptoms or problems  ENDOCRINE: Denies current diagnosis of diabetes  HEMATOLOGY: Denies episodes of easy bleeding      PHYSICAL EXAM  Vitals:    09/05/18 1426   BP: 122/88   Pulse: 85   SpO2: 96%   Weight: 87.5 kg (193 lb)   Height: 1.803 m (5' 11\")     General  - normal appearance, in no obvious distress  CV  - normal peripheral perfusion  Pulm  - normal respiratory pattern, non-labored  Musculoskeletal - cervical spine  - inspection: normal bone and joint alignment, no obvious kyphosis  - palpation: mild tenderness over right rhomboids  - ROM: no pain with rotation or sidebending  - strength: upper extremities 5/5 in all planes  Neuro  - C5-7 DTRs 2+ bilaterally, no sensory or motor deficit, grossly normal coordination, normal muscle tone  Skin  - no ecchymosis, erythema, warmth, or induration, no obvious rash  Psych  - interactive, appropriate, normal mood and affect           ASSESSMENT & PLAN  Mr. Parks is a 37 year old year old male who presents to clinic today with a work-related right posterior shoulder/back strain.    We discussed his condition in detail.  We also discussed a slow return-to-full-work strategy that I do believe is in his best interest.  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  These restrictions will be in place for the next 2-1/2 weeks, approximately.    We should follow-up at that time to reassess.    Thank you for allowing me to participate in Ab's care.    Floyd Costello DO, CAM  Primary Care Sports Medicine         "

## 2018-09-05 NOTE — MR AVS SNAPSHOT
After Visit Summary   2018    Osman Parks    MRN: [id number]           Patient Information     Date Of Birth          1980        Visit Information        Provider Department      2018 2:20 PM Floyd Costello,  [facility] [[email or web address]]        Care Instructions    Thanks for coming today.  Ortho/Sports Medicine Clinic  [address] [place] [address]    To schedule future appointments in Ortho Clinic, you may call [phone].    To schedule ordered imaging by your provider:   Call Central Imaging [phone]    To schedule an injection ordered by your provider:  Call Central Imaging Injection scheduling line: [phone]  [employer] Brandshart available online at:  [email or web address]    Please call if any further questions or concerns ([phone]).  Clinic hours 8 am to 5 pm.    Return to clinic (call) if symptoms worsen or fail to improve.            Follow-ups after your visit        Your next 10 appointments already scheduled     Sep 05, 2018  3:40 PM CDT   CHUNG Extremity with Tristian Gaming, PT   [place] [facility] (CHUNG [address] [place])    [address]  [facility] [address] [phone]              Who to contact     If you have questions or need follow up information about today's clinic visit or your schedule please contact [facility] directly at [phone].  Normal or non-critical lab and imaging results will be communicated to you by MyChart, letter or phone within 4 business days after the clinic has received the results. If you do not hear from us within 7 days, please contact the clinic through MyChart or phone. If you have a critical or abnormal lab result, we will notify you by phone as soon as possible.  Submit refill requests through WAPA or call your pharmacy and they will forward the refill request to us. Please allow 3 business days for your refill to be completed.  "         Additional Information About Your Visit        ClearSlidehart Information     [employer] is an electronic gateway that provides easy, online access to your medical records. With [employer], you can request a clinic appointment, read your test results, renew a prescription or communicate with your care team.     To sign up for [employer] visit the website at [email or web address]   You will be asked to enter the access code listed below, as well as some personal information. Please follow the directions to create your username and password.     Your access code is: [email or web address]  Expires: 2018  1:51 PM     Your access code will  in 90 days. If you need help or a new code, please contact your [facility] or call [phone] for assistance.        Care EveryWhere ID     This is your Care EveryWhere ID. This could be used by other organizations to access your [place] medical records  [id number]        Your Vitals Were     Pulse Height Pulse Oximetry BMI (Body Mass Index)          85 1.803 m (5' 11\") 96% 26.92 kg/m2         Blood Pressure from Last 3 Encounters:   18 122/88   18 124/75   18 114/72    Weight from Last 3 Encounters:   18 87.5 kg (193 lb)   18 87.5 kg (193 lb)   18 87.1 kg (192 lb)              Today, you had the following     No orders found for display       Primary Care Provider Office Phone # Fax #    Panchito Ireland [phone]       [address]  [facility] [address]        Equal Access to Services     [facility]: Hadii aad ku hadasho Soomaali, waaxda luqadaha, qaybta kaalmada zack tinoco. So [facility] [phone].    ATENCIÓN: Si habla español, tiene a aguilar disposición servicios gratuitos de asistencia lingüística. Lisa al [phone].    We comply with applicable federal civil rights laws and [place] laws. We do not discriminate on the basis of race, color, " national origin, age, disability, sex, sexual orientation, or gender identity.            Thank you!     Thank you for choosing [facility]  for your care. Our goal is always to provide you with excellent care. Hearing back from our patients is one way we can continue to improve our services. Please take a few minutes to complete the written survey that you may receive in the mail after your visit with us. Thank you!             Your Updated Medication List - Protect others around you: Learn how to safely use, store and throw away your medicines at www.disposemymeds.org.          This list is accurate as of 9/5/18  2:57 PM.  Always use your most recent med list.                   Brand Name Dispense Instructions for use Diagnosis    cyclobenzaprine 10 MG tablet    FLEXERIL    30 tablet    Take 0.5-1 tablets (5-10 mg) by mouth 3 times daily as needed for muscle spasms    Trapezius strain, right, initial encounter       diclofenac 75 MG EC tablet    VOLTAREN    60 tablet    Take 1 tablet (75 mg) by mouth 2 times daily as needed for moderate pain    Trapezius strain, right, initial encounter

## 2018-09-24 ENCOUNTER — TELEPHONE (OUTPATIENT)
Dept: NURSING | Facility: CLINIC | Age: 38
End: 2018-09-24

## 2018-09-24 ENCOUNTER — THERAPY VISIT (OUTPATIENT)
Dept: PHYSICAL THERAPY | Facility: CLINIC | Age: 38
End: 2018-09-24
Payer: OTHER MISCELLANEOUS

## 2018-09-24 DIAGNOSIS — M54.2 NECK PAIN ON RIGHT SIDE: ICD-10-CM

## 2018-09-24 PROCEDURE — 97110 THERAPEUTIC EXERCISES: CPT | Mod: GP | Performed by: PHYSICAL THERAPY ASSISTANT

## 2018-09-24 PROCEDURE — 97530 THERAPEUTIC ACTIVITIES: CPT | Mod: GP | Performed by: PHYSICAL THERAPY ASSISTANT

## 2018-09-24 NOTE — TELEPHONE ENCOUNTER
Patient returned call.  Scheduled appointment with Dr. Costello for October 4th.  Patient would like to have work restrictions changed to include more weight and for longer time.  We agreed on a 50 pound weight for 6 hours per day.  We will have these restrictions in place until follow up on October 4th with Dr. Costello.  Patient was unsure of fax number, he will call us tomorrow with fax number.  Emmanuelle Esparza RN

## 2018-09-24 NOTE — TELEPHONE ENCOUNTER
Called and LVM for patient to call back and discus his need for a work note and appt. Does he want the same restrictions until he can get into see ?   Carlotta Greco MA.... 3:55 PM....9/24/2018

## 2018-09-24 NOTE — TELEPHONE ENCOUNTER
[facility] Call Center    Phone Message    May a detailed message be left on voicemail: yes    Reason for Call: Other: Appointment and a work comp appt/update work restrictions letter expires today, patient upset, states he was supposed to receive a call last week or today since his letter expires. this is for his right shoulder -   patient will be available on his phone for the rest of the day.    Action Taken: Message routed to:  Adult Clinics: Orthopedics p [id number]

## 2018-09-24 NOTE — TELEPHONE ENCOUNTER
Patient is unsure of what the next steps are. He does not have an upcoming appointment. He feels good he states although did have a little strain in his back today at work. He is not 100% yet but is able to do more than what he has been doing. 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 Please advise.

## 2018-09-24 NOTE — LETTER
[facility]  [address] [place] [address]  [phone]        September 24, 2018    Regarding:  Osman Parks  [address] [place] [address]              To Whom It May Concern;  Osman Charly will have the following restrictions in place until his follow up on October 4th with Dr. Costello.  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            Sincerely,        Floyd Costello, DO

## 2018-09-25 NOTE — TELEPHONE ENCOUNTER
Called patient back, fax number to send letter to; [phone] per patients request.  Letter faxed.  Emmanuelle Esparza RN

## 2018-10-04 ENCOUNTER — OFFICE VISIT (OUTPATIENT)
Dept: ORTHOPEDICS | Facility: CLINIC | Age: 38
End: 2018-10-04
Payer: OTHER MISCELLANEOUS

## 2018-10-04 VITALS — SYSTOLIC BLOOD PRESSURE: 141 MMHG | OXYGEN SATURATION: 97 % | HEART RATE: 101 BPM | DIASTOLIC BLOOD PRESSURE: 87 MMHG

## 2018-10-04 DIAGNOSIS — M25.511 RIGHT SHOULDER PAIN, UNSPECIFIED CHRONICITY: Primary | ICD-10-CM

## 2018-10-04 PROCEDURE — 99213 OFFICE O/P EST LOW 20 MIN: CPT | Performed by: FAMILY MEDICINE

## 2018-10-04 ASSESSMENT — PAIN SCALES - GENERAL: PAINLEVEL: NO PAIN (1)

## 2018-10-04 NOTE — PATIENT INSTRUCTIONS
Thanks for coming today.  Ortho/Sports Medicine Clinic  [address] [place] [address]    To schedule future appointments in Ortho Clinic, you may call [phone].    To schedule ordered imaging by your provider:   Call Central Imaging [phone]    To schedule an injection ordered by your provider:  Call Central Imaging Injection scheduling line: [phone]  C[employer] Communicationshart available online at:  [email or web address]    Please call if any further questions or concerns ([phone]).  Clinic hours 8 am to 5 pm.    Return to clinic (call) if symptoms worsen or fail to improve.

## 2018-10-04 NOTE — MR AVS SNAPSHOT
After Visit Summary   10/4/2018    Osman Parks    MRN: [id number]           Patient Information     Date Of Birth          1980        Visit Information        Provider Department      10/4/2018 4:40 PM Floyd Costello,  [facility]        Today's Diagnoses     Right shoulder pain, unspecified chronicity    -  1      Care Instructions    Thanks for coming today.  Ortho/Sports Medicine Clinic  [address] [place] [address]    To schedule future appointments in Ortho Clinic, you may call [phone].    To schedule ordered imaging by your provider:   Call Central Imaging [phone]    To schedule an injection ordered by your provider:  Call Central Imaging Injection scheduling line: [phone]  TeamLease Serviceshart available online at:  [email or web address]    Please call if any further questions or concerns ([phone]).  Clinic hours 8 am to 5 pm.    Return to clinic (call) if symptoms worsen or fail to improve.            Follow-ups after your visit        Your next 10 appointments already scheduled     Oct 08, 2018  4:00 PM CDT   CHUNG Extremity with Lesli Read, [facility]   [place] [facility] Physical Ther (CH[facility] [address] [place])    [address]  [facility] [address] [phone]              Who to contact     If you have questions or need follow up information about today's clinic visit or your schedule please contact [facility] directly at [phone].  Normal or non-critical lab and imaging results will be communicated to you by MyChart, letter or phone within 4 business days after the clinic has received the results. If you do not hear from us within 7 days, please contact the clinic through MyChart or phone. If you have a critical or abnormal lab result, we will notify you by phone as soon as possible.  Submit refill requests through BookShout! or call your pharmacy and they will forward the  refill request to us. Please allow 3 business days for your refill to be completed.          Additional Information About Your Visit        Care EveryWhere ID     This is your Care EveryWhere ID. This could be used by other organizations to access your [place] medical records  [id number]        Your Vitals Were     Pulse Pulse Oximetry                101 97%           Blood Pressure from Last 3 Encounters:   10/04/18 141/87   09/05/18 122/88   08/28/18 124/75    Weight from Last 3 Encounters:   09/05/18 87.5 kg (193 lb)   08/28/18 87.5 kg (193 lb)   08/17/18 87.1 kg (192 lb)              Today, you had the following     No orders found for display       Primary Care Provider Office Phone # Fax #    Panchito Ireland [phone]       [address]  [facility] [address]        Equal Access to Services     [facility]: Hadii aad edin hadasho Soomaali, waaxda luqadaha, qaybta kaalmada adeegyada, waxay christelin haydenzel lovell . So [facility] [phone].    ATENCIÓN: Si habla español, tiene a aguilar disposición servicios gratuitos de asistencia lingüística. Llame al [phone].    We comply with applicable federal civil rights laws and [place] laws. We do not discriminate on the basis of race, color, national origin, age, disability, sex, sexual orientation, or gender identity.            Thank you!     Thank you for choosing [facility]  for your care. Our goal is always to provide you with excellent care. Hearing back from our patients is one way we can continue to improve our services. Please take a few minutes to complete the written survey that you may receive in the mail after your visit with us. Thank you!             Your Updated Medication List - Protect others around you: Learn how to safely use, store and throw away your medicines at www.disposemymeds.org.          This list is accurate as of 10/4/18 11:59 PM.  Always use your most recent med list.                    Brand Name Dispense Instructions for use Diagnosis    cyclobenzaprine 10 MG tablet    FLEXERIL    30 tablet    Take 0.5-1 tablets (5-10 mg) by mouth 3 times daily as needed for muscle spasms    Trapezius strain, right, initial encounter       diclofenac 75 MG EC tablet    VOLTAREN    60 tablet    Take 1 tablet (75 mg) by mouth 2 times daily as needed for moderate pain    Trapezius strain, right, initial encounter

## 2018-10-04 NOTE — NURSING NOTE
Osman Parks's chief complaint for this visit includes:  Chief Complaint   Patient presents with     RECHECK     right shoulder and neck pain follow up. pt wants to talk about work note      PCP: Panchito Ireland    Referring Provider:  No referring provider defined for this encounter.    /87  Pulse 101  SpO2 97%  No Pain (1)     Do you need any medication refills at today's visit? No

## 2018-10-04 NOTE — PROGRESS NOTES
HISTORY OF PRESENT ILLNESS  Mr. Parks is a pleasant 37 year old year old male following up with right posterior shoulder and back pain.  Ab is doing much better today.  His pain is minimal.  He'd like to discuss getting back to work.  Additional history: as documented      REVIEW OF SYSTEMS (10/4/2018)  10 point ROS of systems including Constitutional, Eyes, Respiratory, Cardiovascular, Gastroenterology, Genitourinary, Integumentary, Musculoskeletal, Psychiatric were all negative except for pertinent positives noted in my HPI.     PHYSICAL EXAM  Vitals:    10/04/18 1655   BP: 141/87   Pulse: 101   SpO2: 97%     General  - normal appearance, in no obvious distress  CV  - normal radial pulse  Pulm  - normal respiratory pattern, non-labored  Musculoskeletal - right shoulder  - inspection: normal bone and joint alignment, no obvious deformity, no scapular winging, no AC step-off  - palpation: no bony or soft tissue tenderness, normal clavicle, non-tender AC  - ROM:  180 deg flexion   45 deg extension   150 deg abduction   90 deg ER   70 deg IR  - strength: 5/5  strength, 5/5 in all shoulder planes  - special tests:  (-) Speed's  (-) Neer  (-) Hawkin's  (-) Prabhu  Neuro  - no sensory or motor deficit, grossly normal coordination, normal muscle tone  Skin  - no ecchymosis, erythema, warmth, or induration, no obvious rash  Psych  - interactive, appropriate, normal mood and affect            ASSESSMENT & PLAN  Mr. Parks is a 37 year old year old male following up with right shoulder pain.    I'm happy Ab is doing better.  I wrote him a note releasing him to work.  We can follow up as needed or if worsening or concerned.    It was a pleasure seeing Ab.        Floyd Costello, DO, CAQSM

## 2018-10-04 NOTE — LETTER
October 4, 2018      Osman Parks  [address]   [place] [place] [address]              Dear Ab Acosta Charly is under my professional care for an overuse injury of his shoulder and back area that he sustained at work.  He is finishing physical therapy and can resumed work as tolerated.  Please allow him to work full duty with no restrictions.    Please call with questions or concerns.      Sincerely,              Floyd Costello, DO CAQSM

## 2018-10-04 NOTE — LETTER
10/4/2018         RE: Osman Parks  [address]   [place] [address]        Dear Colleague,    Thank you for referring your patient, Osman Parks, to the [facility]. Please see a copy of my visit note below.    HISTORY OF PRESENT ILLNESS  Mr. Parks is a pleasant 37 year old year old male following up with right posterior shoulder and back pain.  Ab is doing much better today.  His pain is minimal.  He'd like to discuss getting back to work.  Additional history: as documented      REVIEW OF SYSTEMS (10/4/2018)  10 point ROS of systems including Constitutional, Eyes, Respiratory, Cardiovascular, Gastroenterology, Genitourinary, Integumentary, Musculoskeletal, Psychiatric were all negative except for pertinent positives noted in my HPI.     PHYSICAL EXAM  Vitals:    10/04/18 1655   BP: 141/87   Pulse: 101   SpO2: 97%     General  - normal appearance, in no obvious distress  CV  - normal radial pulse  Pulm  - normal respiratory pattern, non-labored  Musculoskeletal - right shoulder  - inspection: normal bone and joint alignment, no obvious deformity, no scapular winging, no AC step-off  - palpation: no bony or soft tissue tenderness, normal clavicle, non-tender AC  - ROM:  180 deg flexion   45 deg extension   150 deg abduction   90 deg ER   70 deg IR  - strength: 5/5  strength, 5/5 in all shoulder planes  - special tests:  (-) Speed's  (-) Neer  (-) Hawkin's  (-) Prabhu  Neuro  - no sensory or motor deficit, grossly normal coordination, normal muscle tone  Skin  - no ecchymosis, erythema, warmth, or induration, no obvious rash  Psych  - interactive, appropriate, normal mood and affect            ASSESSMENT & PLAN  Mr. Parks is a 37 year old year old male following up with right shoulder pain.    I'm happy Ab is doing better.  I wrote him a note releasing him to work.  We can follow up as needed or if worsening or concerned.    It was a pleasure seeing  Ab.        Floyd Costello DO, [facility]          Again, thank you for allowing me to participate in the care of your patient.        Sincerely,        Floyd Costello DO

## 2020-06-30 ENCOUNTER — TELEPHONE (OUTPATIENT)
Dept: OTOLARYNGOLOGY | Facility: CLINIC | Age: 40
End: 2020-06-30

## 2020-06-30 NOTE — TELEPHONE ENCOUNTER
Reason for Call:  Other appointment    Detailed comments: Patient calling, he would like an appointment for an ear cleaning. Please call back to advise.    Phone Number Patient can be reached at: Cell number on file:    Telephone Information:   Mobile [phone]       Best Time: any    Can we leave a detailed message on this number? YES    Call taken on 6/30/2020 at 2:44 PM by Osman Cabrera

## 2020-07-06 ENCOUNTER — OFFICE VISIT (OUTPATIENT)
Dept: OTOLARYNGOLOGY | Facility: CLINIC | Age: 40
End: 2020-07-06
Payer: COMMERCIAL

## 2020-07-06 VITALS
SYSTOLIC BLOOD PRESSURE: 128 MMHG | OXYGEN SATURATION: 93 % | TEMPERATURE: 97.9 F | BODY MASS INDEX: 27.62 KG/M2 | HEART RATE: 88 BPM | DIASTOLIC BLOOD PRESSURE: 99 MMHG | WEIGHT: 198 LBS

## 2020-07-06 DIAGNOSIS — H60.391 OTHER INFECTIVE CHRONIC OTITIS EXTERNA OF RIGHT EAR: ICD-10-CM

## 2020-07-06 DIAGNOSIS — H73.91 TYMPANIC MEMBRANE DISORDER, RIGHT: ICD-10-CM

## 2020-07-06 DIAGNOSIS — H61.23 BILATERAL IMPACTED CERUMEN: ICD-10-CM

## 2020-07-06 DIAGNOSIS — Z90.89 HISTORY OF MASTOIDECTOMY: Primary | ICD-10-CM

## 2020-07-06 PROCEDURE — 69220 CLEAN OUT MASTOID CAVITY: CPT | Performed by: OTOLARYNGOLOGY

## 2020-07-06 PROCEDURE — 99213 OFFICE O/P EST LOW 20 MIN: CPT | Mod: 25 | Performed by: OTOLARYNGOLOGY

## 2020-07-06 RX ORDER — HYDROCORTISONE VALERATE CREAM 2 MG/G
CREAM TOPICAL 2 TIMES DAILY
Qty: 45 G | Refills: 3 | Status: SHIPPED | OUTPATIENT
Start: 2020-07-06 | End: 2020-07-13

## 2020-07-06 NOTE — LETTER
7/6/2020         RE: Osman Parks  [address]   [facility] [address]        Dear Colleague,    Thank you for referring your patient, Osman Parks, to the [facility]. Please see a copy of my visit note below.    History of Present Illness - Osman Parks is a 39 year old male I have not seen for over three years, and he is a long time chronic ear disease patient.  He has had several ear surgeries, the most recent a RIGHT canal wall down mastoidectomy and middle ear reconstruction over 10 years ago by an outside ENT.  He has most recently been seen for mastoid bowl cleaning in 2018.    He is here for an ear check closer to home and have mastoid bowl cleaning.    Past Medical History -   Patient Active Problem List   Diagnosis     CARDIOVASCULAR SCREENING; LDL GOAL LESS THAN 160     Tympanic membrane disorder, right     History of mastoidectomy     Other infective chronic otitis externa of right ear     Neck pain on right side       Current Medications -   Current Outpatient Medications:      cyclobenzaprine (FLEXERIL) 10 MG tablet, Take 0.5-1 tablets (5-10 mg) by mouth 3 times daily as needed for muscle spasms, Disp: 30 tablet, Rfl: 1     diclofenac (VOLTAREN) 75 MG EC tablet, Take 1 tablet (75 mg) by mouth 2 times daily as needed for moderate pain, Disp: 60 tablet, Rfl: 1    Allergies -   Allergies   Allergen Reactions     Robitussin Cough-Cold D        Social History -   Social History     Socioeconomic History     Marital status:      Spouse name: Not on file     Number of children: Not on file     Years of education: Not on file     Highest education level: Not on file   Occupational History     Not on file   Social Needs     Financial resource strain: Not on file     Food insecurity     Worry: Not on file     Inability: Not on file     Transportation needs     Medical: Not on file     Non-medical: Not on file   Tobacco Use     Smoking status: Light Tobacco Smoker      Types: Cigarettes     Smokeless tobacco: Never Used   Substance and Sexual Activity     Alcohol use: Yes     Alcohol/week: 0.0 standard drinks     Drug use: No     Sexual activity: Yes     Partners: Female   Lifestyle     Physical activity     Days per week: Not on file     Minutes per session: Not on file     Stress: Not on file   Relationships     Social connections     Talks on phone: Not on file     Gets together: Not on file     Attends Christian service: Not on file     Active member of club or organization: Not on file     Attends meetings of clubs or organizations: Not on file     Relationship status: Not on file     Intimate partner violence     Fear of current or ex partner: Not on file     Emotionally abused: Not on file     Physically abused: Not on file     Forced sexual activity: Not on file   Other Topics Concern     Parent/sibling w/ CABG, MI or angioplasty before 65F 55M? Not Asked   Social History Narrative     Not on file       Family History -   Family History   Problem Relation Age of Onset     Diabetes Maternal Grandmother      Breast Cancer Maternal Grandmother        Review of Systems - As per HPI and PMHx, otherwise 10+ system review of the head and neck, and general constitution is negative.    Physical Exam  BP (!) 128/99   Pulse 88   Temp 97.9  F (36.6  C) (Oral)   Wt 89.8 kg (198 lb)   SpO2 93%   BMI 27.62 kg/m      General - The patient is well nourished and well developed, and appears to have good nutritional status.  Alert and oriented to person and place, answers questions and cooperates with examination appropriately.   Head and Face - Normocephalic and atraumatic, with no gross asymmetry noted of the contour of the facial features.  The facial nerve is intact, with strong symmetric movements.  Voice and Breathing - The patient was breathing comfortably without the use of accessory muscles. There was no wheezing, stridor, or stertor.  The patients voice was clear and strong,  and had appropriate pitch and quality.  Eyes - Extraocular movements intact, and the pupils were reactive to light.  Sclera were not icteric or injected, conjunctiva were pink and moist.  Mouth - Examination of the oral cavity showed pink, healthy oral mucosa. No lesions or ulcerations noted.  The tongue was mobile and midline, and the dentition were in good condition.    Throat - The walls of the oropharynx were smooth, pink, moist, symmetric, and had no lesions or ulcerations.  The tonsillar pillars and soft palate were symmetric.  The uvula was midline on elevation.        Procedure - Cleaning and debridement of mastoid bowl  The patient was positioned semi-supine in the examination chair.  I examined the mastoid bowl and performed debridement using the binocular surgical microscope on the RIGHT  ear.  I began by using a cerumen loop to gently peel the squamous debris away from the anterior and inferior aspects of the external canal.  Working my medially, I exposed the tympanic membrane and cleared the debris in an anterior to posterior direction.  Once I was clear of the TM, I then switched to a #7 suction to debulk as much debris as I could from the mastoid bowl.  Once this was done, I used an alligator forcep to grasp the edge of the impacted mass, and rotated out of the ear in several large pieces.  I inspected the mastoid bowl and it was well epithelialized.  No evidence of cholesteatoma or herniation through the tegmen.  The patient tolerated the procedure well.      A/P - Osman Parks is a 39 year old male  (Z90.89) History of mastoidectomy  (primary encounter diagnosis)  (H73.91) Tympanic membrane disorder, right  (H60.391) Other infective chronic otitis externa of right ear  (H61.23) Bilateral impacted cerumen    I have procedurally cleared the RIGHT mastoid bowl today, and things look stable.     The only other new issue is itchy dry external ears. I will try some HC cream for this.    Again, thank  you for allowing me to participate in the care of your patient.        Sincerely,        Francisco Javier Barron MD

## 2020-07-06 NOTE — PROGRESS NOTES
History of Present Illness - Osman Parks is a 39 year old male I have not seen for over three years, and he is a long time chronic ear disease patient.  He has had several ear surgeries, the most recent a RIGHT canal wall down mastoidectomy and middle ear reconstruction over 10 years ago by an outside ENT.  He has most recently been seen for mastoid bowl cleaning in 2018.    He is here for an ear check closer to home and have mastoid bowl cleaning.    Past Medical History -   Patient Active Problem List   Diagnosis     CARDIOVASCULAR SCREENING; LDL GOAL LESS THAN 160     Tympanic membrane disorder, right     History of mastoidectomy     Other infective chronic otitis externa of right ear     Neck pain on right side       Current Medications -   Current Outpatient Medications:      cyclobenzaprine (FLEXERIL) 10 MG tablet, Take 0.5-1 tablets (5-10 mg) by mouth 3 times daily as needed for muscle spasms, Disp: 30 tablet, Rfl: 1     diclofenac (VOLTAREN) 75 MG EC tablet, Take 1 tablet (75 mg) by mouth 2 times daily as needed for moderate pain, Disp: 60 tablet, Rfl: 1    Allergies -   Allergies   Allergen Reactions     Robitussin Cough-Cold D        Social History -   Social History     Socioeconomic History     Marital status:      Spouse name: Not on file     Number of children: Not on file     Years of education: Not on file     Highest education level: Not on file   Occupational History     Not on file   Social Needs     Financial resource strain: Not on file     Food insecurity     Worry: Not on file     Inability: Not on file     Transportation needs     Medical: Not on file     Non-medical: Not on file   Tobacco Use     Smoking status: Light Tobacco Smoker     Types: Cigarettes     Smokeless tobacco: Never Used   Substance and Sexual Activity     Alcohol use: Yes     Alcohol/week: 0.0 standard drinks     Drug use: No     Sexual activity: Yes     Partners: Female   Lifestyle     Physical activity      Days per week: Not on file     Minutes per session: Not on file     Stress: Not on file   Relationships     Social connections     Talks on phone: Not on file     Gets together: Not on file     Attends Judaism service: Not on file     Active member of club or organization: Not on file     Attends meetings of clubs or organizations: Not on file     Relationship status: Not on file     Intimate partner violence     Fear of current or ex partner: Not on file     Emotionally abused: Not on file     Physically abused: Not on file     Forced sexual activity: Not on file   Other Topics Concern     Parent/sibling w/ CABG, MI or angioplasty before 65F 55M? Not Asked   Social History Narrative     Not on file       Family History -   Family History   Problem Relation Age of Onset     Diabetes Maternal Grandmother      Breast Cancer Maternal Grandmother        Review of Systems - As per HPI and PMHx, otherwise 10+ system review of the head and neck, and general constitution is negative.    Physical Exam  BP (!) 128/99   Pulse 88   Temp 97.9  F (36.6  C) (Oral)   Wt 89.8 kg (198 lb)   SpO2 93%   BMI 27.62 kg/m      General - The patient is well nourished and well developed, and appears to have good nutritional status.  Alert and oriented to person and place, answers questions and cooperates with examination appropriately.   Head and Face - Normocephalic and atraumatic, with no gross asymmetry noted of the contour of the facial features.  The facial nerve is intact, with strong symmetric movements.  Voice and Breathing - The patient was breathing comfortably without the use of accessory muscles. There was no wheezing, stridor, or stertor.  The patients voice was clear and strong, and had appropriate pitch and quality.  Eyes - Extraocular movements intact, and the pupils were reactive to light.  Sclera were not icteric or injected, conjunctiva were pink and moist.  Mouth - Examination of the oral cavity showed pink,  healthy oral mucosa. No lesions or ulcerations noted.  The tongue was mobile and midline, and the dentition were in good condition.    Throat - The walls of the oropharynx were smooth, pink, moist, symmetric, and had no lesions or ulcerations.  The tonsillar pillars and soft palate were symmetric.  The uvula was midline on elevation.        Procedure - Cleaning and debridement of mastoid bowl  The patient was positioned semi-supine in the examination chair.  I examined the mastoid bowl and performed debridement using the binocular surgical microscope on the RIGHT  ear.  I began by using a cerumen loop to gently peel the squamous debris away from the anterior and inferior aspects of the external canal.  Working my medially, I exposed the tympanic membrane and cleared the debris in an anterior to posterior direction.  Once I was clear of the TM, I then switched to a #7 suction to debulk as much debris as I could from the mastoid bowl.  Once this was done, I used an alligator forcep to grasp the edge of the impacted mass, and rotated out of the ear in several large pieces.  I inspected the mastoid bowl and it was well epithelialized.  No evidence of cholesteatoma or herniation through the tegmen.  The patient tolerated the procedure well.      A/P - Osman Parks is a 39 year old male  (Z90.89) History of mastoidectomy  (primary encounter diagnosis)  (H73.91) Tympanic membrane disorder, right  (H60.391) Other infective chronic otitis externa of right ear  (H61.23) Bilateral impacted cerumen    I have procedurally cleared the RIGHT mastoid bowl today, and things look stable.     The only other new issue is itchy dry external ears. I will try some HC cream for this.

## 2020-12-06 ENCOUNTER — HEALTH MAINTENANCE LETTER (OUTPATIENT)
Age: 40
End: 2020-12-06

## 2021-09-25 ENCOUNTER — HEALTH MAINTENANCE LETTER (OUTPATIENT)
Age: 41
End: 2021-09-25

## 2022-01-15 ENCOUNTER — HEALTH MAINTENANCE LETTER (OUTPATIENT)
Age: 42
End: 2022-01-15

## 2022-12-26 ENCOUNTER — HEALTH MAINTENANCE LETTER (OUTPATIENT)
Age: 42
End: 2022-12-26

## 2023-04-22 ENCOUNTER — HEALTH MAINTENANCE LETTER (OUTPATIENT)
Age: 43
End: 2023-04-22